# Patient Record
Sex: MALE | Race: WHITE | HISPANIC OR LATINO | Employment: FULL TIME | URBAN - METROPOLITAN AREA
[De-identification: names, ages, dates, MRNs, and addresses within clinical notes are randomized per-mention and may not be internally consistent; named-entity substitution may affect disease eponyms.]

---

## 2017-06-25 ENCOUNTER — HOSPITAL ENCOUNTER (EMERGENCY)
Facility: MEDICAL CENTER | Age: 47
End: 2017-06-25
Attending: EMERGENCY MEDICINE
Payer: COMMERCIAL

## 2017-06-25 VITALS
OXYGEN SATURATION: 92 % | HEIGHT: 65 IN | TEMPERATURE: 98.9 F | DIASTOLIC BLOOD PRESSURE: 98 MMHG | WEIGHT: 202.82 LBS | RESPIRATION RATE: 16 BRPM | SYSTOLIC BLOOD PRESSURE: 152 MMHG | BODY MASS INDEX: 33.79 KG/M2 | HEART RATE: 78 BPM

## 2017-06-25 DIAGNOSIS — S61.412A LACERATION OF LEFT HAND WITHOUT FOREIGN BODY, INITIAL ENCOUNTER: ICD-10-CM

## 2017-06-25 PROCEDURE — 303485 HCHG DRESSING MEDIUM

## 2017-06-25 PROCEDURE — 90471 IMMUNIZATION ADMIN: CPT

## 2017-06-25 PROCEDURE — 90715 TDAP VACCINE 7 YRS/> IM: CPT | Performed by: EMERGENCY MEDICINE

## 2017-06-25 PROCEDURE — 700111 HCHG RX REV CODE 636 W/ 250 OVERRIDE (IP): Performed by: EMERGENCY MEDICINE

## 2017-06-25 PROCEDURE — 303747 HCHG EXTRA SUTURE

## 2017-06-25 PROCEDURE — 99283 EMERGENCY DEPT VISIT LOW MDM: CPT

## 2017-06-25 PROCEDURE — 304999 HCHG REPAIR-SIMPLE/INTERMED LEVEL 1

## 2017-06-25 PROCEDURE — 304217 HCHG IRRIGATION SYSTEM

## 2017-06-25 RX ORDER — CEPHALEXIN 500 MG/1
500 CAPSULE ORAL 4 TIMES DAILY
Qty: 20 CAP | Refills: 0 | Status: SHIPPED | OUTPATIENT
Start: 2017-06-25 | End: 2017-06-30

## 2017-06-25 RX ADMIN — CLOSTRIDIUM TETANI TOXOID ANTIGEN (FORMALDEHYDE INACTIVATED), CORYNEBACTERIUM DIPHTHERIAE TOXOID ANTIGEN (FORMALDEHYDE INACTIVATED), BORDETELLA PERTUSSIS TOXOID ANTIGEN (GLUTARALDEHYDE INACTIVATED), BORDETELLA PERTUSSIS FILAMENTOUS HEMAGGLUTININ ANTIGEN (FORMALDEHYDE INACTIVATED), BORDETELLA PERTUSSIS PERTACTIN ANTIGEN, AND BORDETELLA PERTUSSIS FIMBRIAE 2/3 ANTIGEN 0.5 ML: 5; 2; 2.5; 5; 3; 5 INJECTION, SUSPENSION INTRAMUSCULAR at 20:43

## 2017-06-25 ASSESSMENT — PAIN SCALES - GENERAL: PAINLEVEL_OUTOF10: 4

## 2017-06-25 NOTE — ED AVS SNAPSHOT
6/25/2017    Dalton Santoro  [address]  [place] [address]    Dear Dalton:    [facility] wants to ensure your discharge home is safe and you or your loved ones have had all of your questions answered regarding your care after you leave the hospital.    Below is a list of resources and contact information should you have any questions regarding your hospital stay, follow-up instructions, or active medical symptoms.    Questions or Concerns Regarding… Contact   Medical Questions Related to Your Discharge  (7 days a week, 8am-5pm) Contact a Nurse Care Coordinator   [phone]   Medical Questions Not Related to Your Discharge  (24 hours a day / 7 days a week)  Contact the Nurse Health Line   [phone]    Medications or Discharge Instructions Refer to your discharge packet   or contact your [facility] Primary Care Provider   [phone]   Follow-up Appointment(s) Schedule your appointment via Vobi   or contact Scheduling [phone]   Billing Review your statement via Vobi  or contact Billing [phone]   Medical Records Review your records via Vobi   or contact Medical Records [phone]     You may receive a telephone call within two days of discharge. This call is to make certain you understand your discharge instructions and have the opportunity to have any questions answered. You can also easily access your medical information, test results and upcoming appointments via the Vobi free online health management tool. You can learn more and sign up at [employer]/Vobi. For assistance setting up your Vobi account, please call [phone].    Once again, we want to ensure your discharge home is safe and that you have a clear understanding of any next steps in your care. If you have any questions or concerns, please do not hesitate to contact us, we are here for you. Thank you for choosing [facility] for your healthcare needs.    Sincerely,    Your [facility] Healthcare Team

## 2017-06-25 NOTE — LETTER
"  FORM C-4:  EMPLOYEE’S CLAIM FOR COMPENSATION/ REPORT OF INITIAL TREATMENT  EMPLOYEE’S CLAIM - PROVIDE ALL INFORMATION REQUESTED   First Name  Dalton Last Name  Miladis Santoro Birthdate             Age  1970 47 y.o. Sex  male Claim Number   Home Employee Address  [address]                                     Zip  [address] Height  1.651 m (5' 5\") Weight  92 kg (202 lb 13.2 oz) SSN  Unable to Obtain   Mailing Employee Address                           [address]               Zip  [address] Telephone  [phone] (home)  Primary Language Spoken  ENGLISH   Insurer  REPUBLIC INDEMNITY Third Party   REPUBLIC INDEMNITY Employee's Occupation (Job Title) When Injury or Occupational Disease Occurred  [employer]   Employer's Name  [employer] Telephone  [phone]    Employer Address  [address] Zip  [address]   Date of Injury  6/25/2017       Hour of Injury  4:30 PM Date Employer Notified  6/25/2017 Last Day of Work after Injury or Occupational Disease  6/25/2017 Supervisor to Whom Injury Reported  Fredy   Address or Location of Accident (if applicable)  [[facility] [address]]   What were you doing at the time of accident? (if applicable)  [address]    How did this injury or occupational disease occur? Be specific and answer in detail. Use additional sheet if necessary)  Kojoo barbara y alexandriao antoineo mal y me rita   If you believe that you have an occupational disease, when did you first have knowledge of the disability and it relationship to your employment?  N/A Witnesses to the Accident  N/A     Nature of Injury or Occupational Disease  Laceration  Part(s) of Body Injured or Affected  Hand (L), N/A, N/A    I certify that the above is true and correct to the best of my knowledge and that I have provided this information in order to obtain the benefits of [place] or [place]  I hereby authorize any physician, chiropractor, surgeon, practitioner, or other person, any hospital, including [facility] or [facility] hospital, any medical service organization, any insurance company, or other institution or organization to release to each other, any medical or other information, including benefits paid or payable, pertinent to this injury or disease, except information relative to diagnosis, treatment and/or counseling for AIDS, psychological conditions, alcohol or controlled substances, for which I must give specific authorization.  A Photostat of this authorization shall be as valid as the original.   Date  06/25/2017 Place  [facility] Employee’s Signature   THIS REPORT MUST BE COMPLETED AND MAILED WITHIN 3 WORKING DAYS OF TREATMENT   Place  [facility], EMERGENCY DEPT  Name of Facility   [facility]   Date  6/25/2017 Diagnosis  (S61.412A) Laceration of left hand without foreign body, initial encounter Is there evidence the injured employee was under the influence of alcohol and/or another controlled substance at the time of accident?   Hour  8:31 PM Description of Injury or Disease  Laceration of left hand without foreign body, initial encounter No   Treatment  Suture repair, antibiotic  Have you advised the patient to remain off work five days or more?         No   X-Ray Findings      If Yes   From Date    To Date      From information given by the employee, together with medical evidence, can you directly connect this injury or occupational disease as job incurred?  Yes If No, is the employee capable of: Full Duty  No Modified Duty  Yes   Is additional medical care by a physician indicated?  Yes  Comments:suture removal in one week If Modified Duty, Specify any Limitations / Restrictions  Protect  lacerated hand from trauma and contamination     Do you know of any previous  "injury or disease contributing to this condition or occupational disease?  No   Date  6/25/2017 Print Doctor’s Name  Izabela Seay certify the employer’s copy of this form was mailed on:06/25/2017   Address  [address] [phone] Insurer’s Use Only   [place]  [address]    Provider’s Tax ID Number  [id number] Telephone  Dept: [phone]    Doctor’s Signature  e-IZABELA Haddad M.D. Degree  MD    Original - TREATING PHYSICIAN OR CHIROPRACTOR   Pg 2-Insurer/TPA   Pg 3-Employer   Pg 4-Employee                                                                                                  Form C-4 (rev01/03)     BRIEF DESCRIPTION OF RIGHTS AND BENEFITS  (Pursuant to [place] 616C.050)    Notice of Injury or Occupational Disease (Incident Report Form C-1): If an injury or occupational disease (OD) arises out of and in the course of employment, you must provide written notice to your employer as soon as practicable, but no later than 7 days after the accident or OD. Your employer shall maintain a sufficient supply of the required forms.    Claim for Compensation (Form C-4): If medical treatment is sought, the form C-4 is available at the place of initial treatment. A completed \"Claim for Compensation\" (Form C-4) must be filed within 90 days after an accident or OD. The treating physician or chiropractor must, within 3 working days after treatment, complete and mail to the employer, the employer's insurer and third-party , the Claim for Compensation.    Medical Treatment: If you require medical treatment for your on-the-job injury or OD, you may be required to select a physician or chiropractor from a list provided by your workers’ compensation insurer, if it has contracted with an Organization for Managed Care (MCO) or Preferred Provider Organization (PPO) or providers of health care. If your employer has not entered into a contract with an MCO " or PPO, you may select a physician or chiropractor from the Panel of Physicians and Chiropractors. Any medical costs related to your industrial injury or OD will be paid by your insurer.    Temporary Total Disability (TTD): If your doctor has certified that you are unable to work for a period of at least 5 consecutive days, or 5 cumulative days in a 20-day period, or places restrictions on you that your employer does not accommodate, you may be entitled to TTD compensation.    Temporary Partial Disability (TPD): If the wage you receive upon reemployment is less than the compensation for TTD to which you are entitled, the insurer may be required to pay you TPD compensation to make up the difference. TPD can only be paid for a maximum of 24 months.    Permanent Partial Disability (PPD): When your medical condition is stable and there is an indication of a PPD as a result of your injury or OD, within 30 days, your insurer must arrange for an evaluation by a rating physician or chiropractor to determine the degree of your PPD. The amount of your PPD award depends on the date of injury, the results of the PPD evaluation and your age and wage.    Permanent Total Disability (PTD): If you are medically certified by a treating physician or chiropractor as permanently and totally disabled and have been granted a PTD status by your insurer, you are entitled to receive monthly benefits not to exceed 66 2/3% of your average monthly wage. The amount of your PTD payments is subject to reduction if you previously received a PPD award.    Vocational Rehabilitation Services: You may be eligible for vocational rehabilitation services if you are unable to return to the job due to a permanent physical impairment or permanent restrictions as a result of your injury or occupational disease.    Transportation and Per Abelardo Reimbursement: You may be eligible for travel expenses and per abelardo associated with medical treatment.  Reopening: You  may be able to reopen your claim if your condition worsens after claim closure.    Appeal Process: If you disagree with a written determination issued by the insurer or the insurer does not respond to your request, you may appeal to the Department of Administration, , by following the instructions contained in your determination letter. You must appeal the determination within 70 days from the date of the determination letter at [address], [address], [place], [place] [address], or [address], [place], [place] [address]. If you disagree with the  decision, you may appeal to the Department of Administration, . You must file your appeal within 30 days from the date of the  decision letter at [address], [address], [place], [place] [address], or [address], [place], [place] [address]. If you disagree with a decision of an , you may file a petition for judicial review with the District Court. You must do so within 30 days of the Appeal Officer’s decision. You may be represented by an  at your own expense or you may contact the [facility] for possible representation.    [place]  for Injured Workers (NAIW): If you disagree with a  decision, you may request that NAIW represent you without charge at an  Hearing. For information regarding denial of benefits, you may contact the [facility] at: [address], [address], [place] [address], [phone], or [address], [place] [address], [phone]    To File a Complaint with the Division: If you wish to file a complaint with the  of the Division of Industrial Relations (DIR), please contact the Workers’ Compensation Section, [address], [address], [place], [place] [address], telephone [phone]) [phone], or [address],  [place], [place] [address], telephone [phone].    For assistance with Workers’ Compensation Issues: you may contact the Office of the Governor Consumer Health Assistance, [address] [place] [address], [address], [place], [place] [address], Toll Free 1-104.161.3757, Web site: [email or web address]., E-mail [email or web address].                                                                                                                                                                               __________________________________________________________________                                    06/25/2017            Employee Name / Signature                                                                                                                            Date                                       D-2 (rev. 10/07)

## 2017-06-25 NOTE — ED AVS SNAPSHOT
SuperGen Access Code: [email or web address]  Expires: 7/15/2017  4:15 AM    SuperGen  A secure, online tool to manage your health information     [employer]’s SuperGen® is a secure, online tool that connects you to your personalized health information from the privacy of your home -- day or night - making it very easy for you to manage your healthcare. Once the activation process is completed, you can even access your medical information using the SuperGen lesia, which is available for free in the Apple Lesia store or Google Play store.     SuperGen provides the following levels of access (as shown below):   My Chart Features   [facility] Primary Care Doctor [facility]  Specialists [facility]  Urgent  Care Non-[facility]  Primary Care  Doctor   Email your healthcare team securely and privately 24/7 X X X X   Manage appointments: schedule your next appointment; view details of past/upcoming appointments X      Request prescription refills. X      View recent personal medical records, including lab and immunizations X X X X   View health record, including health history, allergies, medications X X X X   Read reports about your outpatient visits, procedures, consult and ER notes X X X X   See your discharge summary, which is a recap of your hospital and/or ER visit that includes your diagnosis, lab results, and care plan. X X       How to register for SuperGen:  1. Go to  [email or web address].  2. Click on the Sign Up Now box, which takes you to the New Member Sign Up page. You will need to provide the following information:  a. Enter your SuperGen Access Code exactly as it appears at the top of this page. (You will not need to use this code after you’ve completed the sign-up process. If you do not sign up before the expiration date, you must request a new code.)   b. Enter your date of birth.   c. Enter your home email address.   d. Click Submit, and follow the next screen’s instructions.  3. Create a SuperGen ID. This will be your SuperGen  login ID and cannot be changed, so think of one that is secure and easy to remember.  4. Create a Launchups password. You can change your password at any time.  5. Enter your Password Reset Question and Answer. This can be used at a later time if you forget your password.   6. Enter your e-mail address. This allows you to receive e-mail notifications when new information is available in Launchups.  7. Click Sign Up. You can now view your health information.    For assistance activating your Launchups account, call [phone]

## 2017-06-25 NOTE — LETTER
"  FORM C-4:  EMPLOYEE’S CLAIM FOR COMPENSATION/ REPORT OF INITIAL TREATMENT  EMPLOYEE’S CLAIM - PROVIDE ALL INFORMATION REQUESTED   First Name  Dalton Last Name  Miladis Santoro Birthdate             Age  1970 47 y.o. Sex  male Claim Number   Home Employee Address  [address] [place]                                     Zip  [address] Height  1.651 m (5' 5\") Weight  92 kg (202 lb 13.2 oz) N  [id number]   Mailing Employee Address                           [address] [place]               Zip  [address] Telephone  [phone] (home)  Primary Language Spoken  ENGLISH   Insurer  *** Third Party   REPUBLIC INDEMNITY Employee's Occupation (Job Title) When Injury or Occupational Disease Occurred     Employer's Name   Telephone      Employer Address   City   State   Zip     Date of Injury  6/25/2017       Hour of Injury  4:30 PM Date Employer Notified  6/25/2017 Last Day of Work after Injury or Occupational Disease  6/25/2017 Supervisor to Whom Injury Reported  Fredy   Address or Location of Accident (if applicable)  [[address]]   What were you doing at the time of accident? (if applicable)  [address]    How did this injury or occupational disease occur? Be specific and answer in detail. Use additional sheet if necessary)  Malathi jimenez y algo salio mal y cody salinas   If you believe that you have an occupational disease, when did you first have knowledge of the disability and it relationship to your employment?  N/A Witnesses to the Accident  N/A     Nature of Injury or Occupational Disease  Laceration  Part(s) of Body Injured or Affected  Hand (L), N/A, N/A    I certify that the above is true and correct to the best of my knowledge and that I have provided this information in order to obtain the benefits of [place]  I hereby authorize any " physician, chiropractor, surgeon, practitioner, or other person, any hospital, including [facility] or [facility], any medical service organization, any insurance company, or other institution or organization to release to each other, any medical or other information, including benefits paid or payable, pertinent to this injury or disease, except information relative to diagnosis, treatment and/or counseling for AIDS, psychological conditions, alcohol or controlled substances, for which I must give specific authorization.  A Photostat of this authorization shall be as valid as the original.   Date Place   Employee’s Signature   THIS REPORT MUST BE COMPLETED AND MAILED WITHIN 3 WORKING DAYS OF TREATMENT   Place  [facility], EMERGENCY DEPT  Name of Facility   [facility]   Date  6/25/2017 Diagnosis  (S61.412A) Laceration of left hand without foreign body, initial encounter Is there evidence the injured employee was under the influence of alcohol and/or another controlled substance at the time of accident?   Hour  8:19 PM Description of Injury or Disease  Laceration of left hand without foreign body, initial encounter     Treatment     Have you advised the patient to remain off work five days or more?             X-Ray Findings      If Yes   From Date    To Date      From information given by the employee, together with medical evidence, can you directly connect this injury or occupational disease as job incurred?    If No, is the employee capable of: Full Duty    Modified Duty      Is additional medical care by a physician indicated?    If Modified Duty, Specify any Limitations / Restrictions        Do you know of any previous injury or disease contributing to this condition or occupational disease?      Date  6/25/2017 Print Doctor’s Name  Toi Seay I certify the employer’s copy of this form was mailed on:   Address  [address]  "[place] [address]  [phone] Insurer’s Use Only   [facility]  [address]    Provider’s Tax ID Number  [id number] Telephone  Dept: [phone]    Doctor’s Signature    Degree       Original - TREATING PHYSICIAN OR CHIROPRACTOR   Pg 2-Insurer/TPA   Pg 3-Employer   Pg 4-Employee                                                                                                  Form C-4 (rev01/03)     BRIEF DESCRIPTION OF RIGHTS AND BENEFITS  (Pursuant to [place] 616[place])    Notice of Injury or Occupational Disease (Incident Report Form C-1): If an injury or occupational disease (OD) arises out of and in the course of employment, you must provide written notice to your employer as soon as practicable, but no later than 7 days after the accident or OD. Your employer shall maintain a sufficient supply of the required forms.    Claim for Compensation (Form C-4): If medical treatment is sought, the form C-4 is available at the place of initial treatment. A completed \"Claim for Compensation\" (Form C-4) must be filed within 90 days after an accident or OD. The treating physician or chiropractor must, within 3 working days after treatment, complete and mail to the employer, the employer's insurer and third-party , the Claim for Compensation.    Medical Treatment: If you require medical treatment for your on-the-job injury or OD, you may be required to select a physician or chiropractor from a list provided by your workers’ compensation insurer, if it has contracted with an Organization for Managed Care (MCO) or Preferred Provider Organization (PPO) or providers of health care. If your employer has not entered into a contract with an MCO or PPO, you may select a physician or chiropractor from the Panel of Physicians and Chiropractors. Any medical costs related to your industrial injury or OD will be paid by your insurer.    Temporary Total Disability (TTD): If your doctor has certified that " you are unable to work for a period of at least 5 consecutive days, or 5 cumulative days in a 20-day period, or places restrictions on you that your employer does not accommodate, you may be entitled to TTD compensation.    Temporary Partial Disability (TPD): If the wage you receive upon reemployment is less than the compensation for TTD to which you are entitled, the insurer may be required to pay you TPD compensation to make up the difference. TPD can only be paid for a maximum of 24 months.    Permanent Partial Disability (PPD): When your medical condition is stable and there is an indication of a PPD as a result of your injury or OD, within 30 days, your insurer must arrange for an evaluation by a rating physician or chiropractor to determine the degree of your PPD. The amount of your PPD award depends on the date of injury, the results of the PPD evaluation and your age and wage.    Permanent Total Disability (PTD): If you are medically certified by a treating physician or chiropractor as permanently and totally disabled and have been granted a PTD status by your insurer, you are entitled to receive monthly benefits not to exceed 66 2/3% of your average monthly wage. The amount of your PTD payments is subject to reduction if you previously received a PPD award.    Vocational Rehabilitation Services: You may be eligible for vocational rehabilitation services if you are unable to return to the job due to a permanent physical impairment or permanent restrictions as a result of your injury or occupational disease.    Transportation and Per Abelardo Reimbursement: You may be eligible for travel expenses and per abelardo associated with medical treatment.  Reopening: You may be able to reopen your claim if your condition worsens after claim closure.    Appeal Process: If you disagree with a written determination issued by the insurer or the insurer does not respond to your request, you may appeal to the Department of  Administration, , by following the instructions contained in your determination letter. You must appeal the determination within 70 days from the date of the determination letter at [address], [address], [place], [place] [address], or [address], [address], [place], [place] [address]. If you disagree with the  decision, you may appeal to the Department of Administration, . You must file your appeal within 30 days from the date of the  decision letter at [address], [address], [place], [place] [address], or [address], [address], [place], [place] [address]. If you disagree with a decision of an , you may file a petition for judicial review with the District Court. You must do so within 30 days of the Appeal Officer’s decision. You may be represented by an  at your own expense or you may contact the [facility] for possible representation.    [place]  for Injured Workers (NAIW): If you disagree with a  decision, you may request that NAIW represent you without charge at an  Hearing. For information regarding denial of benefits, you may contact the [facility] at: [address], [address], [place] [address], [phone], or [address],  [address], [phone]    To File a Complaint with the Division: If you wish to file a complaint with the  of the Division of Industrial Relations (DIR), please contact the Workers’ Compensation Section, [address], [address], [place], [place] [address], telephone [phone], or [address], [address], [place] [address], telephone [phone].    For assistance with Workers’ Compensation Issues: you may contact the Office of the Governor Consumer Health Assistance, [address], [address], [place], [place] [address], Toll Free  6-984-430-2621, Web site: http://isaiah..nv., E-mail ron@isaiah..nv.                                                                                                                                                                               __________________________________________________________________                                    _________________            Employee Name / Signature                                                                                                                            Date                                       D-2 (rev. 10/07)

## 2017-06-25 NOTE — ED AVS SNAPSHOT
Home Care Instructions                                                                                                                Dalton Santoro   MRN: [id number]    Department:  [facility], Emergency Dept   Date of Visit:  6/25/2017            [facility], Emergency Dept    [address] [place] [address]    Phone:  [phone]      You were seen by     Toi Seay M.D.      Your Diagnosis Was     Laceration of left hand without foreign body, initial encounter     S61.412A       These are the medications you received during your hospitalization from 06/25/2017 1717 to 06/25/2017 2044     Date/Time Order Dose Route Action    06/25/2017 2043 tetanus-dipth-acell pertussis (ADACEL) inj 0.5 mL 0.5 mL Intramuscular Given      Follow-up Information     1. Follow up with [email or web address] In 1 week.    Why:  tayo dc doctor en 7 moscoso    Contact information    [address] [place] [address]  [phone]        Medication Information     Review all of your home medications and newly ordered medications with your primary doctor and/or pharmacist as soon as possible. Follow medication instructions as directed by your doctor and/or pharmacist.     Please keep your complete medication list with you and share with your physician. Update the information when medications are discontinued, doses are changed, or new medications (including over-the-counter products) are added; and carry medication information at all times in the event of emergency situations.               Medication List      START taking these medications        Instructions    Morning Afternoon Evening Bedtime    cephALEXin 500 MG Caps   Commonly known as:  KEFLEX        Take 1 Cap by mouth 4 times a day for 5 days.   Dose:  500 mg                             Where to Get Your Medications      You can get these medications from any pharmacy     Bring a paper prescription for each of  these medications    - cephALEXin 500 MG Caps              Discharge Instructions       Cuidado de un desgarro en los adultos  (Laceration Care, Adult)  Un desgarro es un rita que atraviesa todas las capas de la piel y llega al tejido que se encuentra debajo de la piel. Algunos desgarros cicatrizan por sí solos. Otros se deben cerrar con puntos (suturas), grapas, tiras adhesivas o adhesivo para la piel. El cuidado adecuado de un desgarro reduce al mínimo el riesgo de infecciones y ayuda a ngoc mejor cicatrización.  CÓMO CUIDAR DEL DESGARRO  Si se utilizaron suturas o grapas:  · Mantenga la herida limpia y seca.  · Si le colocaron ngoc venda (vendaje), debe cambiarla al menos ngoc vez al día o erlinda se lo haya indicado el médico. También debe cambiarla si se moja o se ensucia.  · Mantenga la herida completamente seca juan r las primeras 24 horas o erlinda se lo haya indicado el médico. Transcurrido rm tiempo, puede ducharse o evgeny iveth de inmersión. No obstante, asegúrese de no sumergir la herida en agua hasta que le hayan quitado las suturas o las grapas.  · Limpie la herida ngoc vez al día o erlinda se lo haya indicado el médico:  ¨ Lave la herida con agua y jabón.  ¨ Enjuáguela con agua para quitar todo el jabón.  ¨ Seque dando palmaditas con ngoc toalla limpia. No frote la herida.  · Después de limpiar la herida, aplique ngoc delgada capa de ungüento con antibiótico erlinda se lo haya indicado el médico. [place] ayudará a prevenir las infecciones y a evitar que el vendaje se adhiera a la herida.  · Las suturas o las grapas deben retirarse erlinda lo haya indicado el médico.  Si se utilizaron tiras adhesivas:  · Mantenga la herida limpia y seca.  · Si le colocaron ngoc venda (vendaje), debe cambiarla al menos ngoc vez al día o erlinda se lo haya indicado el médico. También debe cambiarla si se moja o se ensucia.  · No deje que las tiras adhesivas se mojen. Puede bañarse o ducharse, disha tenga cuidado de no mojar la herida.  · Si se  moja, séquela dando palmaditas con ngoc toalla limpia. No frote la herida.  · Las tiras adhesivas se caen solas. Puede recortar las tiras a medida que la herida cicatriza. No quite las tiras adhesivas que aún están pegadas a la herida. Ellas se caerán cuando sea el momento.  Si se utilizó pegamento para la piel:  · Trate de mantener la herida seca; sin embargo, puede mojarla ligeramente cuando se bañe o se duche. No sumerja la herida en el agua, por ejemplo, al nadar.  · Después de ducharse o bañarse, seque la herida con cuidado dando palmaditas con ngoc toalla limpia. No frote la herida.  · No practique actividades que lo megan transpirar mucho hasta que el adhesivo se haya salido solo.  · No aplique líquidos, cremas ni ungüentos medicinales en la herida mientras esté el adhesivo. De lo contrario, puede despegar la película de adhesivo antes de que la herida cicatrice.  · Si le colocaron ngoc venda (vendaje), debe cambiarla al menos ngoc vez al día o erlinda se lo haya indicado el médico. También debe cambiarla si se moja o se ensucia.  · Si la herida está cubierta con un vendaje, tenga cuidado de no aplicar cinta adhesiva directamente sobre el adhesivo. De lo contrario, puede hacer que el adhesivo se despegue antes de que la herida haya cicatrizado.  · No toque el adhesivo. Normalmente, el adhesivo permanece sobre la piel de 5 a 10 días y luego se sale solo.  Instrucciones generales  · [place] los medicamentos de venta jesse y los recetados solamente erlinda se lo haya indicado el médico.  · Si le recetaron un ungüento o un medicamento con antibiótico, aplíquelo o tómelo erlinda se lo haya indicado el médico. No deje de usarlo aunque la afección mejore.  · Para ayudar a evitar la formación de cicatrices, cúbrase la herida con pantalla solar siempre que esté al aire jesse después de que le hayan retirado los puntos o las tiras adhesivas o cuando todavía tenga el adhesivo en la piel y la herida haya cicatrizado. Use ngoc pantalla  solar con factor de protección solar (FPS) de por lo menos 30.  · No se rasque ni se toque la herida.  · Concurra a todas las visitas de control erlinda se lo haya indicado el médico. [place] es importante.  · Controle la herida todos los días para detectar signos de infección. Esté atento a lo siguiente:  ¨ Dolor, hinchazón o enrojecimiento.  ¨ Líquido, romeo o pus.  · Cuando esté sentado o acostado, eleve la dana de la lesión por encima del nivel del corazón, si es posible.  SOLICITE ATENCIÓN MÉDICA SI:  · Le aplicaron la antitetánica y tiene hinchazón, dolor intenso, enrojecimiento o hemorragia en el sitio de la inyección.  · Tiene fiebre.  · La herida estaba cerrada y se abre.  · Percibe que sale mal olor de la herida o del vendaje.  · Nota un cuerpo extraño en la herida, erlinda un trozo de rojo o kamilla.  · El dolor no se francis con los medicamentos.  · Tiene más enrojecimiento, hinchazón o dolor en el lugar de la herida.  · Observa líquido, romeo o pus que salen de la herida.  · Observa que la piel cerca de la herida cambia de color.  · Debe cambiar el vendaje con frecuencia debido a que hay secreción de líquido, romeo o pus de la herida.  · Aparece ngoc nueva erupción cutánea.  · Tiene entumecimiento alrededor de la herida.  SOLICITE ATENCIÓN MÉDICA DE INMEDIATO SI:  · Tiene mucha hinchazón alrededor de la herida.  · El dolor aumenta repentinamente y es intenso.  · Tiene bultos dolorosos cerca de la herida o en la piel en cualquier parte del cuerpo.  · Tiene ngoc línea ellen que sale de la herida.  · La herida está en la mano o en el pie y no puede  correctamente alexandrea de los dedos.  · La herida está en la mano o en el pie y observa que los dedos tienen un ly pálido o azulado.     Esta información no tiene erlinda fin reemplazar el consejo del médico. Asegúrese de hacerle al médico cualquier pregunta que tenga.     Document Released: 12/18/2006 Document Revised: 05/03/2016  [employer] Interactive Patient Education  ©2016 [employer]            Patient Information     Patient Information    Following emergency treatment: all patient requiring follow-up care must return either to a private physician or a clinic if your condition worsens before you are able to obtain further medical attention, please return to the emergency room.     Billing Information    At [facility], we work to make the billing process streamlined for our patients.  Our Representatives are here to answer any questions you may have regarding your hospital bill.  If you have insurance coverage and have supplied your insurance information to us, we will submit a claim to your insurer on your behalf.  Should you have any questions regarding your bill, we can be reached online or by phone as follows:  Online: You are able pay your bills online or live chat with our representatives about any billing questions you may have. We are here to help Monday - Friday from 8:00am to 7:30pm and 9:00am - 12:00pm on Saturdays.  Please visit [email or web address]  for more information.   Phone:  [phone] or [phone]    Please note that your emergency physician, surgeon, pathologist, radiologist, anesthesiologist, and other specialists are not employed by [facility] and will therefore bill separately for their services.  Please contact them directly for any questions concerning their bills at the numbers below:     Emergency Physician Services:  [phone]  [facility]:  [phone]  [facility]:  [phone]  [facility]:  [phone]    1. Your final bill may vary from the amount quoted upon discharge if all procedures are not complete at that time, or if your doctor has additional procedures of which we are not aware. You will receive an additional bill if you return to the Emergency Department at [facility] for suture removal regardless of the facility of which the sutures were  placed.     2. Please arrange for settlement of this account at the emergency registration.    3. All self-pay accounts are due in full at the time of treatment.  If you are unable to meet this obligation then payment is expected within 4-5 days.     4. If you have had radiology studies (CT, X-ray, Ultrasound, MRI), you have received a preliminary result during your emergency department visit. Please contact the radiology department [phone] to receive a copy of your final result. Please discuss the Final result with your primary physician or with the follow up physician provided.     Crisis Hotline:  [place] Crisis Hotline:  4-650-LMPSHAM or 1-440.737.4155  [place] Crisis Hotline:    1-888.130.3562 or 817-410-7591         ED Discharge Follow Up Questions    1. In order to provide you with very good care, we would like to follow up with a phone call in the next few days.  May we have your permission to contact you?     YES /  NO    2. What is the best phone number to call you? (       )_____-__________    3. What is the best time to call you?      Morning  /  Afternoon  /  Evening                   Patient Signature:  ____________________________________________________________    Date:  ____________________________________________________________

## 2017-06-26 NOTE — DISCHARGE INSTRUCTIONS
Cuidado de un desgarro en los adultos  (Laceration Care, Adult)  Un desgarro es un rita que atraviesa todas las capas de la piel y llega al tejido que se encuentra debajo de la piel. Algunos desgarros cicatrizan por sí solos. Otros se deben cerrar con puntos (suturas), grapas, tiras adhesivas o adhesivo para la piel. El cuidado adecuado de un desgarro reduce al mínimo el riesgo de infecciones y ayuda a ngoc mejor cicatrización.  CÓMO CUIDAR DEL DESGARRO  Si se utilizaron suturas o grapas:  · Mantenga la herida limpia y seca.  · Si le colocaron ngoc venda (vendaje), debe cambiarla al menos ngoc vez al día o erlinda se lo haya indicado el médico. También debe cambiarla si se moja o se ensucia.  · Mantenga la herida completamente seca juan r las primeras 24 horas o erlinda se lo haya indicado el médico. Transcurrido rm tiempo, puede ducharse o evgeny iveth de inmersión. No obstante, asegúrese de no sumergir la herida en agua hasta que le hayan quitado las suturas o las grapas.  · Limpie la herida ngoc vez al día o erlinda se lo haya indicado el médico:  ¨ Lave la herida con agua y jabón.  ¨ Enjuáguela con agua para quitar todo el jabón.  ¨ Seque dando palmaditas con ngoc toalla limpia. No frote la herida.  · Después de limpiar la herida, aplique ngoc delgada capa de ungüento con antibiótico erlinda se lo haya indicado el médico. Odell ayudará a prevenir las infecciones y a evitar que el vendaje se adhiera a la herida.  · Las suturas o las grapas deben retirarse erlinda lo haya indicado el médico.  Si se utilizaron tiras adhesivas:  · Mantenga la herida limpia y seca.  · Si le colocaron ngoc venda (vendaje), debe cambiarla al menos ngoc vez al día o erlinda se lo haya indicado el médico. También debe cambiarla si se moja o se ensucia.  · No deje que las tiras adhesivas se mojen. Puede bañarse o ducharse, disha tenga cuidado de no mojar la herida.  · Si se moja, séquela dando palmaditas con ngoc toalla limpia. No frote la herida.  · Las tiras  adhesivas se caen solas. Puede recortar las tiras a medida que la herida cicatriza. No quite las tiras adhesivas que aún están pegadas a la herida. Ellas se caerán cuando sea el momento.  Si se utilizó pegamento para la piel:  · Trate de mantener la herida seca; sin embargo, puede mojarla ligeramente cuando se bañe o se duche. No sumerja la herida en el agua, por ejemplo, al nadar.  · Después de ducharse o bañarse, seque la herida con cuidado dando palmaditas con ngoc toalla limpia. No frote la herida.  · No practique actividades que lo megan transpirar mucho hasta que el adhesivo se haya salido solo.  · No aplique líquidos, cremas ni ungüentos medicinales en la herida mientras esté el adhesivo. De lo contrario, puede despegar la película de adhesivo antes de que la herida cicatrice.  · Si le colocaron ngoc venda (vendaje), debe cambiarla al menos ngoc vez al día o erlinda se lo haya indicado el médico. También debe cambiarla si se moja o se ensucia.  · Si la herida está cubierta con un vendaje, tenga cuidado de no aplicar cinta adhesiva directamente sobre el adhesivo. De lo contrario, puede hacer que el adhesivo se despegue antes de que la herida haya cicatrizado.  · No toque el adhesivo. Normalmente, el adhesivo permanece sobre la piel de 5 a 10 días y luego se sale solo.  Instrucciones generales  · Bulger los medicamentos de venta jesse y los recetados solamente erlinda se lo haya indicado el médico.  · Si le recetaron un ungüento o un medicamento con antibiótico, aplíquelo o tómelo erlinda se lo haya indicado el médico. No deje de usarlo aunque la afección mejore.  · Para ayudar a evitar la formación de cicatrices, cúbrase la herida con pantalla solar siempre que esté al aire jesse después de que le hayan retirado los puntos o las tiras adhesivas o cuando todavía tenga el adhesivo en la piel y la herida haya cicatrizado. Use ngoc pantalla solar con factor de protección solar (FPS) de por lo menos 30.  · No se rasque ni se  toque la herida.  · Concurra a todas las visitas de control erlinda se lo haya indicado el médico. [place] es importante.  · Controle la herida todos los días para detectar signos de infección. Esté atento a lo siguiente:  ¨ Dolor, hinchazón o enrojecimiento.  ¨ Líquido, romeo o pus.  · Cuando esté sentado o acostado, eleve la dana de la lesión por encima del nivel del corazón, si es posible.  SOLICITE ATENCIÓN MÉDICA SI:  · Le aplicaron la antitetánica y tiene hinchazón, dolor intenso, enrojecimiento o hemorragia en el sitio de la inyección.  · Tiene fiebre.  · La herida estaba cerrada y se abre.  · Percibe que sale mal olor de la herida o del vendaje.  · Nota un cuerpo extraño en la herida, erlinda un trozo de rojo o kamilla.  · El dolor no se francis con los medicamentos.  · Tiene más enrojecimiento, hinchazón o dolor en el lugar de la herida.  · Observa líquido, romeo o pus que salen de la herida.  · Observa que la piel cerca de la herida cambia de color.  · Debe cambiar el vendaje con frecuencia debido a que hay secreción de líquido, romeo o pus de la herida.  · Aparece ngoc nueva erupción cutánea.  · Tiene entumecimiento alrededor de la herida.  SOLICITE ATENCIÓN MÉDICA DE INMEDIATO SI:  · Tiene mucha hinchazón alrededor de la herida.  · El dolor aumenta repentinamente y es intenso.  · Tiene bultos dolorosos cerca de la herida o en la piel en cualquier parte del cuerpo.  · Tiene ngoc línea ellen que sale de la herida.  · La herida está en la mano o en el pie y no puede  correctamente alexandrea de los dedos.  · La herida está en la mano o en el pie y observa que los dedos tienen un ly pálido o azulado.     Esta información no tiene erlinda fin reemplazar el consejo del médico. Asegúrese de hacerle al médico cualquier pregunta que tenga.     Document Released: 12/18/2006 Document Revised: 05/03/2016  Elsevier Interactive Patient Education ©2016 Elsevier Inc.

## 2017-06-26 NOTE — ED NOTES
Pt and co-worker given verbal and written discharge instructions in [place]ican with one prescription. Both verbalized understanding.

## 2017-06-26 NOTE — ED PROVIDER NOTES
"ED Provider Note    CHIEF COMPLAINT   Chief Complaint   Patient presents with   • Hand Laceration       HPI   Dalton Santoro is a 47 y.o. male who presents with a laceration to the dorsum of his left hand proximal to the base of his thumb.  No foreign by sensation, no numbness or weakness.  He denies loss of motion of the hand.  Last shot is unknown.  This is a work-related injury which has occurred within the past 2 hours.    REVIEW OF SYSTEMS   Musculoskeletal: Left hand pain  Neurologic: No numbness or weakness  Skin: Laceration      PAST MEDICAL HISTORY   History reviewed. No pertinent past medical history.    FAMILY HISTORY  History reviewed. No pertinent family history.    SOCIAL HISTORY  Social History     Social History   • Marital Status:      Spouse Name: N/A   • Number of Children: N/A   • Years of Education: N/A     Social History Main Topics   • Smoking status: Never Smoker    • Smokeless tobacco: Never Used   • Alcohol Use: Yes      Comment: Occasionally   • Drug Use: No   • Sexual Activity: Not Asked     Other Topics Concern   • None     Social History Narrative       SURGICAL HISTORY  History reviewed. No pertinent past surgical history.    CURRENT MEDICATIONS   Home Medications     Reviewed by Bhavin Marcus R.N. (Registered Nurse) on 06/25/17 at 7614  Med List Status: Complete    Medication Last Dose Status          Patient Shay Taking any Medications                        ALLERGIES   No Known Allergies    PHYSICAL EXAM  VITAL SIGNS: /98 mmHg  Pulse 78  Temp(Src) 37.2 °C (98.9 °F)  Resp 16  Ht 1.651 m (5' 5\")  Wt 92 kg (202 lb 13.2 oz)  BMI 33.75 kg/m2  SpO2 92%  Skin: 1.5 cm laceration dorsum of the left hand proximal to the left thumb, no evidence of foreign body within the wound, no exposed tendon.   Vascular: Intact distal capillary refill.   Musculoskeletal: Flexion and extension of all fingers normal.  Opposition of his thumb is normal.  Abduction and " adduction of the thumb is intact.  Neurologic: Sensation to the distal thumb normal    RADIOLOGY/PROCEDURES  Laceration Repair Procedure Note    Indication: Laceration    Procedure: The patient was placed in the appropriate position and anesthesia around the laceration was obtained by infiltration using 1% Lidocaine with epinephrine. The area was then cleansed with betadine and draped in a sterile fashion and irrigated with high pressure normal saline. The laceration was closed with 4-0 Ethilon using interrupted sutures. There were no additional lacerations requiring repair. The wound area was then dressed with bacitracin and a bandage.      Total repaired wound length: 1.5 cm.     Other Items: Suture count: 3    The patient tolerated the procedure well.    Complications: None          COURSE & MEDICAL DECISION MAKING  Pertinent Labs & Imaging studies reviewed. (See chart for details)  Tetanus shot was updated.  He is placed on 5 day course of Keflex.  A shin given modified duty at work, advised to protect the hand from contamination or local trauma to the area of the laceration.  He is advised to go to occupational medicine in one week for suture removal, sooner for any concern of infection    FINAL IMPRESSION     1. Laceration of left hand without foreign body, initial encounter              Electronically signed by: Toi Seay, 6/26/2017 3:22 PM

## 2017-06-30 ENCOUNTER — OCCUPATIONAL MEDICINE (OUTPATIENT)
Dept: OCCUPATIONAL MEDICINE | Facility: CLINIC | Age: 47
End: 2017-06-30
Payer: COMMERCIAL

## 2017-06-30 VITALS
SYSTOLIC BLOOD PRESSURE: 142 MMHG | HEART RATE: 82 BPM | TEMPERATURE: 98.1 F | BODY MASS INDEX: 30.66 KG/M2 | OXYGEN SATURATION: 95 % | WEIGHT: 184 LBS | RESPIRATION RATE: 18 BRPM | DIASTOLIC BLOOD PRESSURE: 68 MMHG | HEIGHT: 65 IN

## 2017-06-30 DIAGNOSIS — S61.412D LACERATION OF LEFT HAND WITHOUT FOREIGN BODY, SUBSEQUENT ENCOUNTER: ICD-10-CM

## 2017-06-30 PROCEDURE — 99203 OFFICE O/P NEW LOW 30 MIN: CPT | Performed by: PREVENTIVE MEDICINE

## 2017-06-30 ASSESSMENT — PAIN SCALES - GENERAL: PAINLEVEL: NO PAIN

## 2017-06-30 NOTE — Clinical Note
[address],   [address] [place] [address]  Phone: [phone] - Fax: [phone]        Occupational Health [facility] Progress Report and Disability Certification  Date of Service: 6/30/2017   No Show:  No  Date / Time of Next Visit:   Discharged    Claim Information   Patient Name: Dalton Santoro  Claim Number:     Employer:   [employer]    Date of Injury: 6/25/2017     Insurer / TPA: [employer]  ID / SSN:     Occupation: [employer]  Diagnosis: The encounter diagnosis was Laceration of left hand without foreign body, subsequent encounter.    Medical Information   Related to Industrial Injury? Yes    Subjective Complaints:  DOI 6/25/17. 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  No foreign by sensation, no numbness or weakness.  He denies loss of motion of the hand. He has no complaints.   Objective Findings: Appearance: Well-developed, well-nourished.   Mental Status: Mood and Affect normal. Pleasant. Cooperative. Appropriate.   ENT: Oropharynx clear. Moist mucous membranes. Hearing normal   Eyes: Pupils reactive. Conjunctiva normal. No scleral icterus.   Neck: Trachea Midline. No thyromegaly. No masses.  Cardiovascular: Normal rate. Regular rhythm. Normal heart sounds.   Chest: Effort normal. Breath sounds clear.   Skin: Skin is warm and dry. No rash.   Musculoskeletal: Left hand shows well-healed superficial laceration dorsum of hand radial surface. 1.5 cm length with 3 sutures   Pre-Existing Condition(s):     Assessment:   Condition Improved    Status: Discharged /  MMI  Permanent Disability:No    Plan:      Diagnostics:      Comments:  Sutures removed    Disability Information   Status: Released to Full Duty    From:  6/30/2017  Through:   Restrictions are:     Physical Restrictions   Sitting:    Standing:    Stooping:    Bending:       Squatting:    Walking:    Climbing:    Pushing:      Pulling:    Other:    Reaching Above Shoulder (L):   Reaching Above Shoulder (R):       Reaching Below Shoulder (L):    Reaching Below Shoulder (R):      Not to exceed Weight Limits   Carrying(hrs):   Weight Limit(lb):   Lifting(hrs):   Weight  Limit(lb):     Comments:      Repetitive Actions   Hands: i.e. Fine Manipulations from Grasping:     Feet: i.e. Operating Foot Controls:     Driving / Operate Machinery:     Physician Name: Narciso Esparza M.D. Physician Signature: NARCISO Díaz M.D. e-Signature: Dr. Eric Malin, Medical Director   Clinic Name / Location: [address],   [address] [place] [address] Clinic Phone Number: Dept: [phone]   Appointment Time: 2:00 Pm Visit Start Time: 2:08 PM   Check-In Time:  2:05 Pm Visit Discharge Time: @2:32PM   Original-Treating Physician or Chiropractor    Page 2-Insurer/TPA    Page 3-Employer    Page 4-Employee

## 2017-06-30 NOTE — MR AVS SNAPSHOT
"        Dalton Santoro   2017 2:00 PM   Occupational Medicine   MRN: [id number]    Department:  [facility]   Dept Phone:  [phone]    Description:  Male : 1970   Provider:  Narciso Esparza M.D.           Reason for Visit     Follow-Up WC DOI 2017 left hand Better RM 25      Allergies as of 2017     No Known Allergies      You were diagnosed with     Laceration of left hand without foreign body, subsequent encounter   [[id number]]         Vital Signs     Blood Pressure Pulse Temperature Respirations Height Weight    142/68 mmHg 82 36.7 °C (98.1 °F) 18 1.651 m (5' 5\") 83.462 kg (184 lb)    Body Mass Index Oxygen Saturation Smoking Status             30.62 kg/m2 95% Never Smoker          Basic Information     Date Of Birth Sex Race Ethnicity Preferred Language Language for Written Material    1970 Male White  Origin (Hungarian,Tristanian,Cambodian,Ahsan, etc) English Hungarian      Health Maintenance     Patient has no pending health maintenance at this time      Current Immunizations     Tdap Vaccine 2017  8:43 PM      Below and/or attached are the medications your provider expects you to take. Review all of your home medications and newly ordered medications with your provider and/or pharmacist. Follow medication instructions as directed by your provider and/or pharmacist. Please keep your medication list with you and share with your provider. Update the information when medications are discontinued, doses are changed, or new medications (including over-the-counter products) are added; and carry medication information at all times in the event of emergency situations     Allergies:  No Known Allergies          Medications  Valid as of: 2017 -  2:34 PM    Generic Name Brand Name Tablet Size Instructions for use    Cephalexin (Cap) KEFLEX 500 MG Take 1 Cap by mouth 4 times a day for 5 days.        .                 Medicines prescribed today were sent " to:     [id number] - [place], [place] - [address] AT [facility]    [address] [place] [address]    Phone: [phone] Fax: [phone]    Open 24 Hours?: No      Medication refill instructions:       If your prescription bottle indicates you have medication refills left, it is not necessary to call your provider’s office. Please contact your pharmacy and they will refill your medication.    If your prescription bottle indicates you do not have any refills left, you may request refills at any time through one of the following ways: The online Birdpost system (except Urgent Care), by calling your provider’s office, or by asking your pharmacy to contact your provider’s office with a refill request. Medication refills are processed only during regular business hours and may not be available until the next business day. Your provider may request additional information or to have a follow-up visit with you prior to refilling your medication.   *Please Note: Medication refills are assigned a new Rx number when refilled electronically. Your pharmacy may indicate that no refills were authorized even though a new prescription for the same medication is available at the pharmacy. Please request the medicine by name with the pharmacy before contacting your provider for a refill.           Birdpost Access Code: [email or web address]  Expires: 7/15/2017  4:15 AM    Birdpost  A secure, online tool to manage your health information     [employer]’s Birdpost® is a secure, online tool that connects you to your personalized health information from the privacy of your home -- day or night - making it very easy for you to manage your healthcare. Once the activation process is completed, you can even access your medical information using the Birdpost lesia, which is available for free in the Apple Lesia store or Google Play store.     Birdpost provides the following levels of access (as shown below):   My  Chart Features   [facility] Doctor [facility]  Specialists [facility]  Urgent  Care Non-[facility]  Primary Care  Doctor   Email your healthcare team securely and privately 24/7 X X X    Manage appointments: schedule your next appointment; view details of past/upcoming appointments X      Request prescription refills. X      View recent personal medical records, including lab and immunizations X X X X   View health record, including health history, allergies, medications X X X X   Read reports about your outpatient visits, procedures, consult and ER notes X X X X   See your discharge summary, which is a recap of your hospital and/or ER visit that includes your diagnosis, lab results, and care plan. X X       How to register for Kopjra:  1. Go to  [email or web address].  2. Click on the Sign Up Now box, which takes you to the New Member Sign Up page. You will need to provide the following information:  a. Enter your Kopjra Access Code exactly as it appears at the top of this page. (You will not need to use this code after you’ve completed the sign-up process. If you do not sign up before the expiration date, you must request a new code.)   b. Enter your date of birth.   c. Enter your home email address.   d. Click Submit, and follow the next screen’s instructions.  3. Create a Kopjra ID. This will be your Kopjra login ID and cannot be changed, so think of one that is secure and easy to remember.  4. Create a Kopjra password. You can change your password at any time.  5. Enter your Password Reset Question and Answer. This can be used at a later time if you forget your password.   6. Enter your e-mail address. This allows you to receive e-mail notifications when new information is available in Kopjra.  7. Click Sign Up. You can now view your health information.    For assistance activating your Kopjra account, call [phone]

## 2017-06-30 NOTE — PROGRESS NOTES
"Subjective:      Dalton Santoro is a 47 y.o. male who presents with Follow-Up      DOI 6/25/17. 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  No foreign by sensation, no numbness or weakness.  He denies loss of motion of the hand. He has no complaints.     HPI    ROS  Comprehensive medical history form reviewed. Pertinent positives and negatives included in HPI.    PFSH: reviewed in Epic    PMH:  has no past medical history on file.  MEDS:   Current outpatient prescriptions:   •  cephALEXin (KEFLEX) 500 MG Cap, Take 1 Cap by mouth 4 times a day for 5 days., Disp: 20 Cap, Rfl: 0  ALLERGIES: No Known Allergies  SURGHX: No past surgical history on file.  SOCHX:  reports that he has never smoked. He has never used smokeless tobacco. He reports that he drinks alcohol. He reports that he does not use illicit drugs.  Work Status: Employer and Job Title reviewed per [employer] form  FH: No pertinent hereditary disorders.          Objective:     /68 mmHg  Pulse 82  Temp(Src) 36.7 °C (98.1 °F)  Resp 18  Ht 1.651 m (5' 5\")  Wt 83.462 kg (184 lb)  BMI 30.62 kg/m2  SpO2 95%     Physical Exam    Appearance: Well-developed, well-nourished.   Mental Status: Mood and Affect normal. Pleasant. Cooperative. Appropriate.   ENT: Oropharynx clear. Moist mucous membranes. Hearing normal   Eyes: Pupils reactive. Conjunctiva normal. No scleral icterus.   Neck: Trachea Midline. No thyromegaly. No masses.  Cardiovascular: Normal rate. Regular rhythm. Normal heart sounds.   Chest: Effort normal. Breath sounds clear.   Skin: Skin is warm and dry. No rash.   Musculoskeletal: Left hand shows well-healed superficial laceration dorsum of hand radial surface. 1.5 cm length with 3 sutures       Assessment/Plan:     1. Laceration of left hand without foreign body, subsequent encounter  New to Occupational Health from emergency " department  Condition improved  Sutures removed  Regular work  Released from care

## 2023-04-27 ENCOUNTER — TELEPHONE (OUTPATIENT)
Dept: CARDIOLOGY | Facility: MEDICAL CENTER | Age: 53
End: 2023-04-27
Payer: COMMERCIAL

## 2023-04-27 NOTE — TELEPHONE ENCOUNTER
Chart prep:    Used  line :    Patient was seen at [facility] for abnormal EKG.    Theses records are in chart. Unable to read EKG though.

## 2023-05-03 ENCOUNTER — OFFICE VISIT (OUTPATIENT)
Dept: CARDIOLOGY | Facility: MEDICAL CENTER | Age: 53
End: 2023-05-03
Payer: COMMERCIAL

## 2023-05-03 VITALS
HEIGHT: 65 IN | HEART RATE: 58 BPM | WEIGHT: 201 LBS | BODY MASS INDEX: 33.49 KG/M2 | RESPIRATION RATE: 16 BRPM | SYSTOLIC BLOOD PRESSURE: 130 MMHG | DIASTOLIC BLOOD PRESSURE: 84 MMHG | OXYGEN SATURATION: 95 %

## 2023-05-03 DIAGNOSIS — E78.2 MIXED HYPERLIPIDEMIA: ICD-10-CM

## 2023-05-03 DIAGNOSIS — I10 ESSENTIAL HYPERTENSION: ICD-10-CM

## 2023-05-03 DIAGNOSIS — R07.89 ATYPICAL CHEST PAIN: ICD-10-CM

## 2023-05-03 PROCEDURE — 99244 OFF/OP CNSLTJ NEW/EST MOD 40: CPT | Performed by: INTERNAL MEDICINE

## 2023-05-03 RX ORDER — INDOMETHACIN 50 MG/1
CAPSULE ORAL
COMMUNITY
Start: 2023-04-03 | End: 2024-03-15

## 2023-05-03 RX ORDER — LISINOPRIL 10 MG/1
10 TABLET ORAL DAILY
COMMUNITY
Start: 2023-04-03

## 2023-05-03 RX ORDER — TETRACYCLINE HYDROCHLORIDE 500 MG/1
CAPSULE ORAL
COMMUNITY
Start: 2023-03-02

## 2023-05-03 RX ORDER — ROSUVASTATIN CALCIUM 10 MG/1
10 TABLET, COATED ORAL DAILY
COMMUNITY
Start: 2023-04-03

## 2023-05-03 RX ORDER — SIMETHICONE 180 MG/1
CAPSULE, LIQUID FILLED ORAL
COMMUNITY
Start: 2023-03-02

## 2023-05-03 RX ORDER — METRONIDAZOLE 500 MG/1
TABLET ORAL
COMMUNITY
Start: 2023-03-03

## 2023-05-03 RX ORDER — OMEPRAZOLE 40 MG/1
CAPSULE, DELAYED RELEASE ORAL
COMMUNITY
Start: 2023-03-03

## 2023-05-03 NOTE — PROGRESS NOTES
Chief Complaint   Patient presents with    Chest Pain     New patient interp ID [id number] Radha Santoro is a 53 y.o. male who presents today for initial consultation regarding chest pain.  Had 2 episodes of central sharp chest pain radiating to the back lasting 30 seconds not associated with exertion.  This was 3 months ago and they have not recurred he continues to be active working his job.  Does not smoke drink excessively or do drugs and has no family history precocious CAD.  EKG shows nonspecific ST changes.    History reviewed. No pertinent past medical history.  History reviewed. No pertinent surgical history.  History reviewed. No pertinent family history.  Social History     Socioeconomic History    Marital status:      Spouse name: Not on file    Number of children: Not on file    Years of education: Not on file    Highest education level: Not on file   Occupational History    Not on file   Tobacco Use    Smoking status: Never    Smokeless tobacco: Never   Substance and Sexual Activity    Alcohol use: Yes     Comment: 1-2 times weekly    Drug use: No    Sexual activity: Not on file   Other Topics Concern    Not on file   Social History Narrative    Not on file     Social Determinants of Health     Financial Resource Strain: Not on file   Food Insecurity: Not on file   Transportation Needs: Not on file   Physical Activity: Not on file   Stress: Not on file   Social Connections: Not on file   Intimate Partner Violence: Not on file   Housing Stability: Not on file     No Known Allergies  Outpatient Encounter Medications as of 5/3/2023   Medication Sig Dispense Refill    lisinopril (PRINIVIL) 10 MG Tab Take 10 mg by mouth every day.      omeprazole (PRILOSEC) 40 MG delayed-release capsule TAKE ONE CAPSULE BY MOUTH 30 MINUTES BEFORE MEALS TWICE DAILY FOR FOURTEEN DAYS      rosuvastatin (CRESTOR) 10 MG Tab Take 10 mg by mouth every day.      STOMACH RELIEF 262 MG Chew  "Tab CHEW 1 TABLET BY MOUTH FOUR TIMES DAILY FOR FOURTEEN DAYS (Patient not taking: Reported on 5/3/2023)      indomethacin (INDOCIN) 50 MG Cap TAKE ONE CAPSULE BY MOUTH THREE TIMES DAILY WITH FOOD OR MILK (Patient not taking: Reported on 5/3/2023)      metroNIDAZOLE (FLAGYL) 500 MG Tab TAKE 1/2 TABLET BY MOUTH FOUR TIMES DAILY FOR FOURTEEN DAYS (Patient not taking: Reported on 5/3/2023)      tetracycline (SUMYCIN) 500 MG Cap TAKE ONE CAPSULE BY MOUTH FOUR TIMES DAILY BEFORE MEALS FOR FOURTEEN DAYS (Patient not taking: Reported on 5/3/2023)       No facility-administered encounter medications on file as of 5/3/2023.     Review of Systems   All other systems reviewed and are negative.           Objective     /84 (BP Location: Left arm, Patient Position: Sitting)   Pulse (!) 58   Resp 16   Ht 1.651 m (5' 5\")   Wt 91.2 kg (201 lb)   SpO2 95%   BMI 33.45 kg/m²     Physical Exam  Vitals and nursing note reviewed.   Constitutional:       General: He is not in acute distress.     Appearance: Normal appearance.   HENT:      Head: Normocephalic and atraumatic.      Right Ear: External ear normal.      Left Ear: External ear normal.      Nose: Nose normal.   Eyes:      Conjunctiva/sclera: Conjunctivae normal.   Cardiovascular:      Rate and Rhythm: Normal rate and regular rhythm.      Pulses: Normal pulses.      Heart sounds: No murmur heard.  Pulmonary:      Effort: Pulmonary effort is normal. No respiratory distress.      Breath sounds: Normal breath sounds.   Abdominal:      General: There is no distension.      Palpations: Abdomen is soft.   Musculoskeletal:      Cervical back: No rigidity or tenderness.      Right lower leg: No edema.      Left lower leg: No edema.   Skin:     General: Skin is warm and dry.      Capillary Refill: Capillary refill takes 2 to 3 seconds.   Neurological:      General: No focal deficit present.      Mental Status: He is alert and oriented to person, place, and time.   Psychiatric:  "        Mood and Affect: Mood normal.         Behavior: Behavior normal.         Thought Content: Thought content normal.     LABS:  No results found for: CHOLSTRLTOT, LDL, HDL, TRIGLYCERIDE    Lab Results   Component Value Date/Time    WBC 15.0 (H) 12/28/2016 10:56 PM    RBC 5.42 12/28/2016 10:56 PM    HEMOGLOBIN 15.1 12/28/2016 10:56 PM    HEMATOCRIT 44.6 12/28/2016 10:56 PM    MCV 82.3 12/28/2016 10:56 PM    NEUTSPOLYS 86.10 (H) 12/28/2016 10:56 PM    LYMPHOCYTES 10.10 (L) 12/28/2016 10:56 PM    MONOCYTES 3.40 12/28/2016 10:56 PM    EOSINOPHILS 0.00 12/28/2016 10:56 PM    BASOPHILS 0.10 12/28/2016 10:56 PM     Lab Results   Component Value Date/Time    SODIUM 133 (L) 12/28/2016 10:56 PM    POTASSIUM 4.1 12/28/2016 10:56 PM    CHLORIDE 100 12/28/2016 10:56 PM    CO2 21 12/28/2016 10:56 PM    GLUCOSE 126 (H) 12/28/2016 10:56 PM    BUN 23 (H) 12/28/2016 10:56 PM    CREATININE 1.00 12/28/2016 10:56 PM         Lab Results   Component Value Date/Time    ALKPHOSPHAT 49 12/28/2016 10:56 PM    ASTSGOT 23 12/28/2016 10:56 PM    ALTSGPT 30 12/28/2016 10:56 PM    TBILIRUBIN 0.6 12/28/2016 10:56 PM      Lab Results   Component Value Date/Time    BNPBTYPENAT <2 12/28/2016 10:56 PM      No results found for: TSH  Lab Results   Component Value Date/Time    PROTHROMBTM 12.8 12/28/2016 10:56 PM    INR 0.93 12/28/2016 10:56 PM        Outside ECG reviewed located in media         Assessment & Plan     1. Atypical chest pain  NM-CARDIAC STRESS TEST    EC-ECHOCARDIOGRAM COMPLETE W/O CONT      2. Essential hypertension        3. Mixed hyperlipidemia            Medical Decision Making: Today's Assessment/Status/Plan:          Atypical chest pain risk factors including hypertension hyperlipidemia obesity and age and gender.  Recommend treadmill stress MPI and echocardiogram.  Follow-up after testing.

## 2023-05-22 ENCOUNTER — HOSPITAL ENCOUNTER (OUTPATIENT)
Dept: RADIOLOGY | Facility: MEDICAL CENTER | Age: 53
End: 2023-05-22
Attending: INTERNAL MEDICINE
Payer: COMMERCIAL

## 2023-05-22 DIAGNOSIS — R07.89 ATYPICAL CHEST PAIN: ICD-10-CM

## 2023-05-27 ENCOUNTER — APPOINTMENT (OUTPATIENT)
Dept: RADIOLOGY | Facility: MEDICAL CENTER | Age: 53
End: 2023-05-27
Attending: EMERGENCY MEDICINE
Payer: COMMERCIAL

## 2023-05-27 ENCOUNTER — HOSPITAL ENCOUNTER (EMERGENCY)
Facility: MEDICAL CENTER | Age: 53
End: 2023-05-27
Attending: EMERGENCY MEDICINE
Payer: COMMERCIAL

## 2023-05-27 VITALS
BODY MASS INDEX: 32.95 KG/M2 | SYSTOLIC BLOOD PRESSURE: 123 MMHG | WEIGHT: 197.75 LBS | DIASTOLIC BLOOD PRESSURE: 78 MMHG | TEMPERATURE: 97.3 F | RESPIRATION RATE: 17 BRPM | OXYGEN SATURATION: 97 % | HEIGHT: 65 IN | HEART RATE: 79 BPM

## 2023-05-27 DIAGNOSIS — M25.572 ACUTE LEFT ANKLE PAIN: ICD-10-CM

## 2023-05-27 DIAGNOSIS — M10.9 ACUTE GOUT OF LEFT ANKLE, UNSPECIFIED CAUSE: ICD-10-CM

## 2023-05-27 PROCEDURE — A9270 NON-COVERED ITEM OR SERVICE: HCPCS | Performed by: EMERGENCY MEDICINE

## 2023-05-27 PROCEDURE — 700102 HCHG RX REV CODE 250 W/ 637 OVERRIDE(OP): Performed by: EMERGENCY MEDICINE

## 2023-05-27 PROCEDURE — 99284 EMERGENCY DEPT VISIT MOD MDM: CPT

## 2023-05-27 PROCEDURE — 73610 X-RAY EXAM OF ANKLE: CPT | Mod: LT

## 2023-05-27 RX ORDER — PREDNISONE 20 MG/1
60 TABLET ORAL DAILY
Qty: 15 TABLET | Refills: 0 | Status: SHIPPED | OUTPATIENT
Start: 2023-05-27 | End: 2023-05-27 | Stop reason: SDUPTHER

## 2023-05-27 RX ORDER — OXYCODONE HYDROCHLORIDE AND ACETAMINOPHEN 5; 325 MG/1; MG/1
1 TABLET ORAL ONCE
Status: COMPLETED | OUTPATIENT
Start: 2023-05-27 | End: 2023-05-27

## 2023-05-27 RX ORDER — PREDNISONE 20 MG/1
60 TABLET ORAL DAILY
Qty: 15 TABLET | Refills: 0 | Status: SHIPPED | OUTPATIENT
Start: 2023-05-27 | End: 2023-06-01

## 2023-05-27 RX ORDER — OXYCODONE HYDROCHLORIDE AND ACETAMINOPHEN 5; 325 MG/1; MG/1
1 TABLET ORAL EVERY 4 HOURS PRN
Qty: 15 TABLET | Refills: 0 | Status: SHIPPED | OUTPATIENT
Start: 2023-05-27 | End: 2023-05-27 | Stop reason: SDUPTHER

## 2023-05-27 RX ORDER — OXYCODONE HYDROCHLORIDE AND ACETAMINOPHEN 5; 325 MG/1; MG/1
1 TABLET ORAL EVERY 4 HOURS PRN
Qty: 15 TABLET | Refills: 0 | Status: SHIPPED | OUTPATIENT
Start: 2023-05-27 | End: 2023-06-01

## 2023-05-27 RX ADMIN — OXYCODONE HYDROCHLORIDE AND ACETAMINOPHEN 1 TABLET: 5; 325 TABLET ORAL at 10:40

## 2023-05-27 ASSESSMENT — PAIN DESCRIPTION - PAIN TYPE: TYPE: ACUTE PAIN

## 2023-05-27 NOTE — ED NOTES
Pt discharged independent and ambulatory with steady gait with all belongings to the lobby. Discharge instructions reviewed with pt and all follow up questions answered. Vitals and condition stable for discharge.

## 2023-05-27 NOTE — ED PROVIDER NOTES
ER Provider Note    Scribed for Nelson Moreau M.D. by Vinay Coombs. 5/27/2023   10:12 AM    Primary Care Provider: Pcp Unknown    CHIEF COMPLAINT  Chief Complaint   Patient presents with    Ankle Pain     10/10 left ankle pain x 1 week with inflammation. Hx gout and stopped taking gout medication stating he no longer needed it. Took Tylenol on Monday with no relief. Has not taken any IBU     EXTERNAL RECORDS REVIEWED  Inpatient Notes Patient had a nuclear medicine cardiac stress test scheduled on 5/22/2023 and has not followed up with his cardiologist yet to obtain the results.    HPI/ROS  LIMITATION TO HISTORY   Select: : None  OUTSIDE HISTORIAN(S):  Family at bedside to confirm history    Dalton Torres is a 53 y.o. male with a history of gout who presents to the ED for evaluation of left ankle pain onset 1 week ago. He rates the pain as a 10/10 in severity. He has associated swelling of the left ankle. He recently stopped taking his gout medications because he did not think he needed it anymore. He notes that he dropped a rock on his foot 1 month ago. No alleviating or exacerbating factors noted.    PAST MEDICAL HISTORY  Past Medical History:   Diagnosis Date    Gout 06/2019       SURGICAL HISTORY  History reviewed. No pertinent surgical history.    FAMILY HISTORY  History reviewed. No pertinent family history.    SOCIAL HISTORY   reports that he has never smoked. He has never used smokeless tobacco. He reports current alcohol use of about 1.2 oz of alcohol per week. He reports that he does not use drugs.    CURRENT MEDICATIONS  Previous Medications    INDOMETHACIN (INDOCIN) 50 MG CAP    TAKE ONE CAPSULE BY MOUTH THREE TIMES DAILY WITH FOOD OR MILK    LISINOPRIL (PRINIVIL) 10 MG TAB    Take 10 mg by mouth every day.    METRONIDAZOLE (FLAGYL) 500 MG TAB    TAKE 1/2 TABLET BY MOUTH FOUR TIMES DAILY FOR FOURTEEN DAYS    OMEPRAZOLE (PRILOSEC) 40 MG DELAYED-RELEASE CAPSULE    TAKE ONE CAPSULE BY  "MOUTH 30 MINUTES BEFORE MEALS TWICE DAILY FOR FOURTEEN DAYS    ROSUVASTATIN (CRESTOR) 10 MG TAB    Take 10 mg by mouth every day.    STOMACH RELIEF 262 MG CHEW TAB    CHEW 1 TABLET BY MOUTH FOUR TIMES DAILY FOR FOURTEEN DAYS    TETRACYCLINE (SUMYCIN) 500 MG CAP    TAKE ONE CAPSULE BY MOUTH FOUR TIMES DAILY BEFORE MEALS FOR FOURTEEN DAYS       ALLERGIES  No Known Allergies     PHYSICAL EXAM  BP (!) 140/85   Pulse 76   Temp 36.5 °C (97.7 °F) (Temporal)   Resp 16   Ht 1.651 m (5' 5\")   Wt 89.7 kg (197 lb 12 oz)   SpO2 99%   BMI 32.91 kg/m²      Nursing note and vitals reviewed.  Constitutional: Well-developed and well-nourished. No distress.   HENT: Head is normocephalic and atraumatic. Oropharynx is clear and moist without exudate or erythema.   Eyes: Pupils are equal, round, and reactive to light. Conjunctiva are normal.   Cardiovascular: Normal rate and regular rhythm. No murmur heard. Normal radial pulses.  Pulmonary/Chest: Breath sounds normal. No wheezes or rales.   Abdominal: Soft and non-tender. No distention    Musculoskeletal: Tenderness and swelling over the medial malleolus. Minimal redness. No joint effusion noted.   Neurological: Awake, alert and oriented to person, place, and time. No focal deficits noted.  Skin: Skin is warm and dry. No rash.   Psychiatric: Normal mood and affect. Appropriate for clinical situation    DIAGNOSTIC STUDIES    Radiology:   This attending emergency physician has independently interpreted the diagnostic imaging associated with this visit and is awaiting the final reading from the radiologist.   Preliminary interpretation is a follows: No apparent fracture or dislocation on x-ray    Radiologist interpretation:   DX-ANKLE 3+ VIEWS LEFT   Final Result      Soft tissue swelling. No fracture or dislocation.           INITIAL ASSESSMENT AND PLAN    10:12 AM - Patient was evaluated at bedside. Patient presents with left ankle pain and swelling onset 1 week ago. I suspect gout " but with history of trauma I will obtain an x-ray. Patient verbalizes understanding and support with my plan of care. Ordered for DX-Ankle left to evaluate. Patient will be medicated with Percocet 5-325 mg for pain.    ED Observation Status? No; Patient does not meet criteria for ED Observation.     11:22 AM - Patient's imaging reveals no acute fracture or dislocation. Patient was reevaluated at bedside and updated with this information. I explained that his pain is likely from his gout. I advised he start taking his medication again as well as the steroids I will prescribe. I also recommended he get established with a PCP to follow up with for managing his gout. He is understandable and agreeable with the plan of care. Discussed discharge instructions and return precautions with the patient and they were cleared for discharge. Patient was given the opportunity to ask any further questions. He is comfortable with discharge at this time.          The patient was treated with narcotics for pain control.  Placed on cardiac and blood pressure monitor to monitor for associated hypotension.  Also placed on pulse oximetry to monitor for hypoxia associated with medication administration/side effect.    DISPOSITION AND DISCUSSIONS    I have discussed management of the patient with the following physicians and KIRA's:  None    Discussion of management with other QHP or appropriate source(s): None     Escalation of care considered, and ultimately not performed: acute inpatient care management, however at this time, the patient is most appropriate for outpatient management.        Decision tools and prescription drugs considered including, but not limited to: Antibiotics   . I considered prescribing antibiotics, however I have not identified a bacterial source of infection.      Patient will be discharged home.    FOLLOW UP:  [facility], Emergency Dept  [address]  [address]  [phone]    If symptoms worsen      OUTPATIENT MEDICATIONS:  New Prescriptions    OXYCODONE-ACETAMINOPHEN (PERCOCET) 5-325 MG TAB    Take 1 Tablet by mouth every four hours as needed for Moderate Pain for up to 5 days.    PREDNISONE (DELTASONE) 20 MG TAB    Take 3 Tablets by mouth every day for 5 days.       FINAL DIANGOSIS  1. Acute left ankle pain    2. Acute gout of left ankle, unspecified cause         Vinay BLUE (Scribe), am scribing for, and in the presence of, Nelson Moreau M.D..    Electronically signed by: Vinay Coombs (Scribe), 5/27/2023    INelson M.D. personally performed the services described in this documentation, as scribed by Vinay Coombs in my presence, and it is both accurate and complete.      The note accurately reflects work and decisions made by me.  Nelson Moreau M.D.  5/27/2023  2:49 PM

## 2023-05-27 NOTE — ED TRIAGE NOTES
Dalton Stark Brian  53 y.o. male  Chief Complaint   Patient presents with    Ankle Pain     10/10 left ankle pain x 1 week with inflammation. Hx gout and stopped taking gout medication stating he no longer needed it. Took Tylenol on Monday with no relief. Has not taken any IBU       Pt ambulatory to triage with steady gait for above complaint.     Pt is GCS 15, speaking in full sentences, follows commands and responds appropriately to questions. Resp are even and unlabored.     Pt placed in lobby. Pt educated on triage process. Pt encouraged to alert staff for any changes.     This RN masked and in appropriate PPE during encounter.     Vitals:    05/27/23 0942   BP: (!) 140/85   Pulse: 76   Resp: 16   Temp: 36.5 °C (97.7 °F)   SpO2: 99%

## 2023-06-05 ENCOUNTER — HOSPITAL ENCOUNTER (OUTPATIENT)
Dept: RADIOLOGY | Facility: MEDICAL CENTER | Age: 53
End: 2023-06-05
Attending: INTERNAL MEDICINE
Payer: COMMERCIAL

## 2023-06-05 PROCEDURE — 78452 HT MUSCLE IMAGE SPECT MULT: CPT

## 2023-06-05 RX ORDER — AMINOPHYLLINE 25 MG/ML
100 INJECTION, SOLUTION INTRAVENOUS
Status: DISCONTINUED | OUTPATIENT
Start: 2023-06-05 | End: 2023-06-06 | Stop reason: HOSPADM

## 2023-06-05 RX ORDER — REGADENOSON 0.08 MG/ML
0.4 INJECTION, SOLUTION INTRAVENOUS ONCE
Status: DISCONTINUED | OUTPATIENT
Start: 2023-06-05 | End: 2023-06-06 | Stop reason: HOSPADM

## 2023-06-06 PROCEDURE — 78452 HT MUSCLE IMAGE SPECT MULT: CPT | Mod: 26 | Performed by: INTERNAL MEDICINE

## 2023-06-06 PROCEDURE — 93018 CV STRESS TEST I&R ONLY: CPT | Performed by: INTERNAL MEDICINE

## 2023-08-28 ENCOUNTER — HOSPITAL ENCOUNTER (OUTPATIENT)
Dept: CARDIOLOGY | Facility: MEDICAL CENTER | Age: 53
End: 2023-08-28
Attending: INTERNAL MEDICINE
Payer: COMMERCIAL

## 2023-08-28 DIAGNOSIS — R07.89 ATYPICAL CHEST PAIN: ICD-10-CM

## 2023-08-28 PROCEDURE — 93306 TTE W/DOPPLER COMPLETE: CPT

## 2023-09-05 LAB
LV EJECT FRACT  99904: 65
LV EJECT FRACT MOD 2C 99903: 68.17
LV EJECT FRACT MOD 4C 99902: 69.06
LV EJECT FRACT MOD BP 99901: 66.31

## 2023-09-05 PROCEDURE — 93306 TTE W/DOPPLER COMPLETE: CPT | Mod: 26 | Performed by: INTERNAL MEDICINE

## 2023-09-18 ENCOUNTER — TELEPHONE (OUTPATIENT)
Dept: CARDIOLOGY | Facility: MEDICAL CENTER | Age: 53
End: 2023-09-18
Payer: COMMERCIAL

## 2023-09-18 NOTE — TELEPHONE ENCOUNTER
----- Message from Sergio Overton M.D. sent at 9/15/2023  4:18 PM PDT -----  No worrisome findings on echocardiogram.  Great news.

## 2023-09-18 NOTE — TELEPHONE ENCOUNTER
Phone Number Called: [phone]     services were used in the patient's primary language of Cypriot.     Name or Number: Naomie #[id number]    Mode of interpretation: Telephone    Content of Interpretation:    We have reviewed your echocardiogram (heart ultrasound) and there are no significant abnormalities in your heart's structure or function. Your heart's pumping power is within normal range. This is excellent news. There are no recommended changes to your plan of care at this time. Please continue current medications and treatment recommendations. Please keep any currently scheduled follow up appointments.

## 2024-03-15 ENCOUNTER — APPOINTMENT (OUTPATIENT)
Dept: RADIOLOGY | Facility: MEDICAL CENTER | Age: 54
End: 2024-03-15
Attending: EMERGENCY MEDICINE
Payer: COMMERCIAL

## 2024-03-15 ENCOUNTER — HOSPITAL ENCOUNTER (EMERGENCY)
Facility: MEDICAL CENTER | Age: 54
End: 2024-03-15
Attending: EMERGENCY MEDICINE
Payer: COMMERCIAL

## 2024-03-15 VITALS
BODY MASS INDEX: 32.95 KG/M2 | OXYGEN SATURATION: 91 % | WEIGHT: 197.75 LBS | HEART RATE: 66 BPM | SYSTOLIC BLOOD PRESSURE: 116 MMHG | RESPIRATION RATE: 18 BRPM | HEIGHT: 65 IN | DIASTOLIC BLOOD PRESSURE: 72 MMHG | TEMPERATURE: 98.1 F

## 2024-03-15 DIAGNOSIS — M10.9 ACUTE GOUT OF RIGHT ANKLE, UNSPECIFIED CAUSE: ICD-10-CM

## 2024-03-15 PROCEDURE — 73610 X-RAY EXAM OF ANKLE: CPT | Mod: RT

## 2024-03-15 PROCEDURE — 700102 HCHG RX REV CODE 250 W/ 637 OVERRIDE(OP): Performed by: EMERGENCY MEDICINE

## 2024-03-15 PROCEDURE — A9270 NON-COVERED ITEM OR SERVICE: HCPCS | Performed by: EMERGENCY MEDICINE

## 2024-03-15 PROCEDURE — 99284 EMERGENCY DEPT VISIT MOD MDM: CPT

## 2024-03-15 RX ORDER — INDOMETHACIN 50 MG/1
50 CAPSULE ORAL
Status: COMPLETED | OUTPATIENT
Start: 2024-03-15 | End: 2024-03-15

## 2024-03-15 RX ORDER — OXYCODONE HYDROCHLORIDE AND ACETAMINOPHEN 5; 325 MG/1; MG/1
1 TABLET ORAL EVERY 4 HOURS PRN
Qty: 15 TABLET | Refills: 0 | Status: SHIPPED | OUTPATIENT
Start: 2024-03-15 | End: 2024-03-20

## 2024-03-15 RX ORDER — INDOMETHACIN 50 MG/1
50 CAPSULE ORAL 3 TIMES DAILY
Qty: 90 CAPSULE | Refills: 0 | Status: SHIPPED | OUTPATIENT
Start: 2024-03-15

## 2024-03-15 RX ORDER — OXYCODONE HYDROCHLORIDE AND ACETAMINOPHEN 5; 325 MG/1; MG/1
1 TABLET ORAL ONCE
Status: COMPLETED | OUTPATIENT
Start: 2024-03-15 | End: 2024-03-15

## 2024-03-15 RX ADMIN — OXYCODONE HYDROCHLORIDE AND ACETAMINOPHEN 1 TABLET: 5; 325 TABLET ORAL at 07:26

## 2024-03-15 RX ADMIN — INDOMETHACIN 50 MG: 50 CAPSULE ORAL at 08:08

## 2024-03-15 NOTE — ED NOTES
RN reviewed d/c instructions w/ patient and visitor* including follow up and prescription information. Pt is alert and oriented. Pt  / visitor verbalized understanding of all instructions for discharge and is agreeable to plan of care. Pt is ambulatory out of ED with steady gait accompanied by family.      Pt agrees not to drive or operate any heavy machinery after receiving / ingesting controlled substances.  Pt  educated on risks/ benefits of being prescribed a controlled substances and agrees to  use prescription  as prescribed by MD.

## 2024-03-15 NOTE — ED NOTES
Pt w/c from lobby to room. Pt connected to monitors. Pt unable to bear weight on right ankle. Ankle swollen and painful to touch.

## 2024-03-15 NOTE — ED PROVIDER NOTES
ED Provider Note    Scribed for Daphne Estrella M.D. by Phil Prajapati. 3/15/2024, 7:05 AM.    Primary care provider: Pcp Unknown  Means of arrival: Private Vehicle.  History obtained from: Patient and his son.  History limited by: Honduran, but family was able to translate.    CHIEF COMPLAINT  Chief Complaint   Patient presents with    Ankle Pain     R sided ankle pain and swelling for approx 1 week. Pt denies known injury or trauma        HPI/ROS  Dalton Torres is a 54 y.o. male with a history of gout who presents to the Emergency Department for evaluation of worsening right sided ankle pain onset 1 week ago. The patient denies any trauma to the ankle. The patient reports his current pain feels like gout flare. He states he has been taking his allopurinol but this did not prevent this attack.  In the past he has taken medication that has helped him, he cannot recall the medication.  Denies fevers, chills, numbness, tingling or weakness.    EXTERNAL RECORDS REVIEWED  Hospital Records Reviewed: The patient was seen in May, 2023 for ankle pain secondary to gout. He was treated with percocet and indomethacin.     LIMITATION TO HISTORY   Select: Language Honduran, family able to translate.    OUTSIDE HISTORIAN(S):  Family was present and assisted in translating for the patient.      PAST MEDICAL HISTORY   has a past medical history of Gout (06/2019).    SURGICAL HISTORY  patient denies any surgical history    SOCIAL HISTORY  Social History     Tobacco Use    Smoking status: Never    Smokeless tobacco: Never   Vaping Use    Vaping Use: Never used   Substance Use Topics    Alcohol use: Yes     Alcohol/week: 1.2 oz     Types: 2 Cans of beer per week     Comment: 1-2 times weekly    Drug use: No      Social History     Substance and Sexual Activity   Drug Use No       FAMILY HISTORY  History reviewed. No pertinent family history.    CURRENT MEDICATIONS  Home Medications       Reviewed by Miya Kumar,  "PATRICE (Registered Nurse) on 03/15/24 at 0556  Med List Status: Partial     Medication Last Dose Status   indomethacin (INDOCIN) 50 MG Cap  Active   lisinopril (PRINIVIL) 10 MG Tab  Active   metroNIDAZOLE (FLAGYL) 500 MG Tab  Active   omeprazole (PRILOSEC) 40 MG delayed-release capsule  Active   rosuvastatin (CRESTOR) 10 MG Tab  Active   STOMACH RELIEF 262 MG Chew Tab  Active   tetracycline (SUMYCIN) 500 MG Cap  Active                    ALLERGIES  No Known Allergies    PHYSICAL EXAM  VITAL SIGNS: BP (!) 152/83   Pulse 70   Temp 36 °C (96.8 °F) (Temporal)   Resp 16   Ht 1.651 m (5' 5\")   Wt 89.7 kg (197 lb 12 oz)   SpO2 94%   BMI 32.91 kg/m²   Vitals reviewed by myself.  Physical Exam  Nursing note and vitals reviewed.  Constitutional: Well-developed and well-nourished. No acute distress.   HENT: Head is normocephalic and atraumatic.  Eyes: extra-ocular movements intact  Cardiovascular: Regular rate and regular rhythm.  2+ bilateral distal pedal pulses  Pulmonary/Chest: Breath sounds normal. No wheezes or rales.     Musculoskeletal: Patient has swelling and erythema to the right lateral ankle, decreased range of motion of the ankle secondary to pain.,  Sensation intact in bilateral lower extremities  Neurological: Awake and alert  Skin: Skin is warm and dry. No rash.     DIAGNOSTIC STUDIES:    RADIOLOGY  Images independently interpreted by myself prior to radiologist review:  -Right ankle x-ray demonstrates no acute findings  Final interpretation by radiology demonstrates:    DX-ANKLE 3+ VIEWS RIGHT   Final Result         1.  No acute traumatic bony injury.           The radiologist's interpretation of all radiological studies have been reviewed by me.      COURSE & MEDICAL DECISION MAKING    ED Observation Status? No; Patient does not meet criteria for ED Observation.     INITIAL ASSESSMENT, ED COURSE AND PLAN    Patient is a 54-year-old male who comes in for evaluation of right ankle pain.  Differential " diagnosis includes gout attack, sprain, strain.  Low suspicion for septic arthritis as patient has had similar symptoms in the past and this feels like his gout.  Will obtain x-ray to assess for bony pathology, no indication for labs at this time.    Patient's initial vitals are within normal limits.  He is treated with indomethacin and Percocet.  Ankle x-ray returns and demonstrates no acute traumatic bony injury.  At this time patient is reassured and advised on management of likely gout flareup.  He is provided with prescriptions for Percocet and indomethacin and given strict return precautions.  Patient is then discharged in stable condition.       REASSESSMENTS   7:05 AM - Patient seen and examined at bedside. Discussed plan of care, including treating the patient's pain and obtaining imaging to monitor his symptoms. Patient agrees to the plan of care. The patient will be medicated for pain. Ordered for imaging to evaluate his symptoms.       7:56 AM - I reevaluated the patient at bedside. The patient informs me they feel improved following Indocin and Percocet administration. I discussed the patient's diagnostic study results which. I discussed plan for discharge and follow up as outlined below. The patient is stable for discharge at this time and will return for any new or worsening symptoms. Patient verbalizes understanding and support with my plan for discharge.        DISPOSITION AND DISCUSSIONS  I have discussed management of the patient with the following physicians and KIRA's:  None    Discussion of management with other QHP or appropriate source(s): None     Escalation of care considered, and ultimately not performed:see above    Barriers to care at this time, including but not limited to: Patient does not have established PCP.     Decision tools and prescription drugs considered including, but not limited to: see above.    I reviewed prescription monitoring program for patient's narcotic use before  prescribing a scheduled drug.The patient will not drink alcohol nor drive with prescribed medications. The patient will return for new or worsening symptoms and is stable at the time of discharge.    The patient is referred to a primary physician for blood pressure management, diabetic screening, and for all other preventative health concerns.    NARCOTICS ATTESTATION  In prescribing controlled substances to this patient, I certify that I have obtained and reviewed the medical history of Dalton Torres. I have also made a good ismael effort to obtain applicable records from other providers who have treated the patient and records did not demonstrate any increased risk of substance abuse that would prevent me from prescribing controlled substances.     I have conducted a physical exam and documented it. I have reviewed Mr. Lincoln Torres’s prescription history as maintained by the [place] Prescription Monitoring Program.     I have assessed the patient’s risk for abuse, dependency, and addiction using the validated Opioid Risk Tool available at https://www.[employer]La.com/kjqtcz-xjmp-uwsd-ort-narcotic-abuse.     Given the above, I believe the benefits of controlled substance therapy outweigh the risks. The reasons for prescribing controlled substances include non-narcotic, oral analgesic alternatives have been inadequate for pain control. Accordingly, I have discussed the risk and benefits, treatment plan, and alternative therapies with the patient.      DISPOSITION:   Patient will be discharged home in stable condition.    FOLLOW UP:  [facility]  [address] [address]  [phone]          OUTPATIENT MEDICATIONS:  New Prescriptions    INDOMETHACIN (INDOCIN) 50 MG CAP    Take 1 Capsule by mouth 3 times a day.    OXYCODONE-ACETAMINOPHEN (PERCOCET) 5-325 MG TAB    Take 1 Tablet by mouth every four hours as needed for Moderate Pain or Severe Pain for up to  5 days.        FINAL IMPRESSION  1. Acute gout of right ankle, unspecified cause            Phil BLUE (Scribe), am scribing for, and in the presence of, Daphne Estrella M.D..    Electronically signed by: Phil Prajapati (Jewellibdevika), 3/15/2024    Daphne BLUE M.D. personally performed the services described in this documentation, as scribed by Phil Prajapati in my presence, and it is both accurate and complete.    The note accurately reflects work and decisions made by me.  Daphne Estrella M.D.  3/15/2024  8:53 AM

## 2024-03-15 NOTE — ED NOTES
Pt in bed, connected to pulse ox, bp. Rn introduced self to pt and visitor.   Medicated per MAR for pain.

## 2024-03-15 NOTE — ED TRIAGE NOTES
"Chief Complaint   Patient presents with    Ankle Pain     R sided ankle pain and swelling for approx 1 week. Pt denies known injury or trauma      BP (!) 152/83   Pulse 70   Temp 36 °C (96.8 °F) (Temporal)   Resp 16   Ht 1.651 m (5' 5\")   Wt 89.7 kg (197 lb 12 oz)   SpO2 94%   BMI 32.91 kg/m²     Pt brought in by family for above cc  Pt states R ankle pain and swelling began approx 1 week ago, -injury/trauma  Pt ambulates w/ limp favoring R side  R ankle mildly red and some mild swelling noted  CMS intact, -numbness/tingling     Hx of gout and takes Allopurinol, pt reports pain feels similar to gout flare up    Pt to lobby, educated on rooming process   "

## 2024-09-23 ENCOUNTER — HOSPITAL ENCOUNTER (EMERGENCY)
Facility: MEDICAL CENTER | Age: 54
End: 2024-09-23
Attending: EMERGENCY MEDICINE
Payer: COMMERCIAL

## 2024-09-23 ENCOUNTER — APPOINTMENT (OUTPATIENT)
Dept: RADIOLOGY | Facility: MEDICAL CENTER | Age: 54
End: 2024-09-23
Attending: EMERGENCY MEDICINE
Payer: COMMERCIAL

## 2024-09-23 VITALS
WEIGHT: 197 LBS | RESPIRATION RATE: 16 BRPM | HEART RATE: 74 BPM | BODY MASS INDEX: 32.78 KG/M2 | OXYGEN SATURATION: 92 % | DIASTOLIC BLOOD PRESSURE: 84 MMHG | TEMPERATURE: 97 F | SYSTOLIC BLOOD PRESSURE: 146 MMHG

## 2024-09-23 DIAGNOSIS — M25.562 ACUTE PAIN OF LEFT KNEE: ICD-10-CM

## 2024-09-23 DIAGNOSIS — M23.92 INTERNAL DERANGEMENT OF LEFT KNEE: ICD-10-CM

## 2024-09-23 PROCEDURE — A9270 NON-COVERED ITEM OR SERVICE: HCPCS | Performed by: EMERGENCY MEDICINE

## 2024-09-23 PROCEDURE — 73562 X-RAY EXAM OF KNEE 3: CPT | Mod: LT

## 2024-09-23 PROCEDURE — 700102 HCHG RX REV CODE 250 W/ 637 OVERRIDE(OP): Performed by: EMERGENCY MEDICINE

## 2024-09-23 PROCEDURE — 99283 EMERGENCY DEPT VISIT LOW MDM: CPT

## 2024-09-23 RX ORDER — IBUPROFEN 600 MG/1
600 TABLET, FILM COATED ORAL ONCE
Status: COMPLETED | OUTPATIENT
Start: 2024-09-23 | End: 2024-09-23

## 2024-09-23 RX ORDER — NAPROXEN 500 MG/1
500 TABLET ORAL 2 TIMES DAILY WITH MEALS
Qty: 60 TABLET | Refills: 0 | Status: SHIPPED | OUTPATIENT
Start: 2024-09-23 | End: 2024-09-25

## 2024-09-23 RX ADMIN — IBUPROFEN 600 MG: 600 TABLET, FILM COATED ORAL at 20:35

## 2024-09-23 ASSESSMENT — PAIN DESCRIPTION - PAIN TYPE: TYPE: ACUTE PAIN

## 2024-09-23 NOTE — Clinical Note
Dalton Torres was seen and treated in our emergency department on 9/23/2024.  He may return to work on 09/25/2024.  Please excuse from work tomorrow.  He must be able to use crutches as needed and wear a knee brace..     If you have any questions or concerns, please don't hesitate to call.      Rodrick Richardson M.D.

## 2024-09-24 NOTE — DISCHARGE INSTRUCTIONS
Ice to your knee 20 minutes on, 20 minutes off as often as possible.  Use the knee immobilizer and crutches to help get around.  Naproxen or Tylenol for pain.  Return the emergency department if you have increasing pain, numbness, tingling or cold blue foot.

## 2024-09-24 NOTE — ED NOTES
Discharge instructions given to patient, prescriptions provided, a verbal understanding of all instructions was stated. Pt preferred to be taken by wheel chair out accompanied by family VSS,  all belongings accounted for.    0

## 2024-09-24 NOTE — ED TRIAGE NOTES
Chief Complaint   Patient presents with    Knee Pain     Patient complains of hearing a pop in left knee while rising from a chair today. Patient with pain in knee and difficulty walking.         Patient educated on triage process. Informed to notified ED staff of change in symptoms.     Vitals:    09/23/24 1814   BP: (!) 127/94   Pulse: 83   Resp: 17   Temp: 36.1 °C (97 °F)   SpO2: 95%

## 2024-09-24 NOTE — ED NOTES
Pt ambulated to the room. Placed on SpO2 and BP monitors. Call light within reach. No further complaints at this time. Chart up for ERP.

## 2024-09-24 NOTE — ED PROVIDER NOTES
"ER Provider Note    Scribed for Rodrick Richardson M.d. by Selwyn Hernández. 9/23/2024  7:26 PM    Primary Care Provider: Pcp Unknown    CHIEF COMPLAINT   Chief Complaint   Patient presents with    Knee Pain     Patient complains of hearing a pop in left knee while rising from a chair today. Patient with pain in knee and difficulty walking.      EXTERNAL RECORDS REVIEWED  Inpatient Notes seen in the emergency department in March for gout    HPI/ROS  LIMITATION TO HISTORY   Select: Language [place],  Used   OUTSIDE HISTORIAN(S):  Son was present at bedside.     Dalton Torres is a 54 y.o. male who presents to the ED complaining of knee pain. The patient explains while rising from his chair earlier today, he made a \"wrong move\" hearing a pop coming from his left knee that has now caused him pain. He states it seemed like the knee \"broke\". The patient is able to apply pressure to his left leg but does feel discomfort while doing so. No additional pain or symptoms noted at this time.     PAST MEDICAL HISTORY  Past Medical History:   Diagnosis Date    Gout 06/2019       SURGICAL HISTORY  History reviewed. No pertinent surgical history.    FAMILY HISTORY  History reviewed. No pertinent family history.    SOCIAL HISTORY   reports that he has never smoked. He has never used smokeless tobacco. He reports current alcohol use of about 1.2 oz of alcohol per week. He reports that he does not use drugs.    CURRENT MEDICATIONS  Previous Medications    INDOMETHACIN (INDOCIN) 50 MG CAP    Take 1 Capsule by mouth 3 times a day.    LISINOPRIL (PRINIVIL) 10 MG TAB    Take 10 mg by mouth every day.    METRONIDAZOLE (FLAGYL) 500 MG TAB    TAKE 1/2 TABLET BY MOUTH FOUR TIMES DAILY FOR FOURTEEN DAYS    OMEPRAZOLE (PRILOSEC) 40 MG DELAYED-RELEASE CAPSULE    TAKE ONE CAPSULE BY MOUTH 30 MINUTES BEFORE MEALS TWICE DAILY FOR FOURTEEN DAYS    ROSUVASTATIN (CRESTOR) 10 MG TAB    Take 10 mg by mouth every day.    STOMACH RELIEF " 262 MG CHEW TAB    CHEW 1 TABLET BY MOUTH FOUR TIMES DAILY FOR FOURTEEN DAYS    TETRACYCLINE (SUMYCIN) 500 MG CAP    TAKE ONE CAPSULE BY MOUTH FOUR TIMES DAILY BEFORE MEALS FOR FOURTEEN DAYS       ALLERGIES  Patient has no known allergies.    PHYSICAL EXAM  BP (!) 127/94   Pulse 83   Temp 36.1 °C (97 °F) (Temporal)   Resp 17   Wt 89.4 kg (197 lb)   SpO2 95% Comment: RA  BMI 32.78 kg/m²   Constitutional: Well developed, Well nourished, mild distress.   HENT: Normocephalic, Atraumatic,.   Eyes: Conjunctiva normal, No discharge.   Neck: Supple, No stridor.   Cardiovascular: Normal heart rate, Normal rhythm, No murmurs, equal pulses.   Pulmonary: Normal breath sounds, No respiratory distress, No wheezing, No rales, No rhonchi.  Musculoskeletal: Tenderness to the medial joint compartment with compression in that area. Able to flex to a 90 degrees. no joint laxity.  Negative anterior and posterior drawer test.  No obvious joint effusion.  Skin: Warm, Dry, No erythema, No rash.   Neurologic: Alert & oriented x 3, Normal motor function,  No focal deficits noted.   Psychiatric: Affect normal, Judgment normal, Mood normal.      DIAGNOSTIC STUDIES    RADIOLOGY/PROCEDURES   The attending emergency physician has independently interpreted the diagnostic imaging associated with this visit and am waiting the final reading from the radiologist.   My preliminary interpretation is a follows: X-ray does not show any fracture.    Radiologist interpretation:  DX-KNEE 3 VIEWS LEFT   Final Result         1.  No acute traumatic bony injury.          COURSE & MEDICAL DECISION MAKING     ASSESSMENT, COURSE AND PLAN  Care Narrative:   8:11 PM - Patient seen and examined at bedside. Discussed plan of care, including obtaining imagining for further evaluation. Patient agrees to the plan of care. Ordered for DX-left knee to evaluate his symptoms.      9:15 PM- Imagining are reassuring at this time. Will refer the patient to follow up with  Orthopedics for symptoms. The patient will be discharged with Naprosyn 500 mg and should return if symptoms worsen or if new symptoms arise. The patient understands and agrees to plan.      PROBLEM LIST  Problem #1 knee pain at this point time I am concerned the patient may have had a meniscal tear versus a sprain of one of his ligaments.  Will place the patient in a knee immobilizer and given crutches.  Discussed with him x-rays do not show any fracture he will likely need an MRI.    DISPOSITION AND DISCUSSIONS  I have discussed management of the patient with the following physicians and KIRA's:  None    Discussion of management with other QHP or appropriate source(s): None     Escalation of care considered, and ultimately not performed: diagnostic imaging and acute inpatient care management, however at this time, the patient is most appropriate for outpatient management.    Barriers to care at this time, including but not limited to: Patient does not have established PCP.     Decision tools and prescription drugs considered including, but not limited to: Naprosyn 500 mg.    The patient will return for new or worsening symptoms and is stable at the time of discharge.    DISPOSITION:  Patient will be discharged home in stable condition.    FOLLOW UP:  Leonel Riojas M.D.  [address] [facility] [address]  [phone]    Schedule an appointment as soon as possible for a visit in 1 week        OUTPATIENT MEDICATIONS:  New Prescriptions    NAPROXEN (NAPROSYN) 500 MG TAB    Take 1 Tablet by mouth 2 times a day with meals.      FINAL DIANGOSIS  1. Acute pain of left knee    2. Internal derangement of left knee      Selwyn BLUE), am scribing for, and in the presence of, LOUIE Andujar*.    Electronically signed by: Selwyn Hernánedz (Ember), 9/23/2024    Rodrick BLUE M.* personally performed the services described in this documentation, as scribed by Selwyn Hernández in my presence, and it  is both accurate and complete.      The note accurately reflects work and decisions made by me.  Rodrick Richardson M.D.  9/23/2024  11:59 PM